# Patient Record
Sex: FEMALE | Race: BLACK OR AFRICAN AMERICAN | Employment: UNEMPLOYED | URBAN - METROPOLITAN AREA
[De-identification: names, ages, dates, MRNs, and addresses within clinical notes are randomized per-mention and may not be internally consistent; named-entity substitution may affect disease eponyms.]

---
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
## 2023-09-29 ENCOUNTER — HOSPITAL ENCOUNTER (OUTPATIENT)
Dept: DATA CONVERSION | Facility: HOSPITAL | Age: 18
Setting detail: OBSERVATION
Discharge: HOME | End: 2023-09-29
Attending: EMERGENCY MEDICINE | Admitting: EMERGENCY MEDICINE
Payer: COMMERCIAL

## 2023-09-29 DIAGNOSIS — T07.XXXA UNSPECIFIED MULTIPLE INJURIES, INITIAL ENCOUNTER: ICD-10-CM

## 2023-09-29 DIAGNOSIS — G89.11 ACUTE PAIN DUE TO TRAUMA: ICD-10-CM

## 2023-09-29 DIAGNOSIS — S85.109A: ICD-10-CM

## 2023-09-29 LAB
ABO GROUP (TYPE) IN BLOOD: NORMAL
ABO GROUP (TYPE) IN BLOOD: NORMAL
ANION GAP IN SER/PLAS: 22 MMOL/L (ref 10–20)
ANTIBODY SCREEN: NORMAL
CALCIUM (MG/DL) IN SER/PLAS: 8.5 MG/DL (ref 8.6–10.6)
CARBON DIOXIDE, TOTAL (MMOL/L) IN SER/PLAS: 15 MMOL/L (ref 21–32)
CHLORIDE (MMOL/L) IN SER/PLAS: 105 MMOL/L (ref 98–107)
CREATININE (MG/DL) IN SER/PLAS: 1.16 MG/DL (ref 0.5–1.05)
ERYTHROCYTE DISTRIBUTION WIDTH (RATIO) BY AUTOMATED COUNT: 12.6 % (ref 11.5–14.5)
ERYTHROCYTE MEAN CORPUSCULAR HEMOGLOBIN CONCENTRATION (G/DL) BY AUTOMATED: 36.6 G/DL (ref 32–36)
ERYTHROCYTE MEAN CORPUSCULAR VOLUME (FL) BY AUTOMATED COUNT: 79 FL (ref 80–100)
ERYTHROCYTES (10*6/UL) IN BLOOD BY AUTOMATED COUNT: 3.23 X10E12/L (ref 4–5.2)
ETHANOL (MG/DL) IN SER/PLAS: 223 MG/DL
FIBRINOGEN (MG/DL) IN PPP BY COAGULATION ASSAY: 263 MG/DL (ref 200–400)
GFR FEMALE: 69 ML/MIN/1.73M2
GLUCOSE (MG/DL) IN SER/PLAS: 96 MG/DL (ref 74–99)
HEMATOCRIT (%) IN BLOOD BY AUTOMATED COUNT: 25.4 % (ref 36–46)
HEMOGLOBIN (G/DL) IN BLOOD: 9.3 G/DL (ref 12–16)
INR IN PPP BY COAGULATION ASSAY: 1.1 (ref 0.9–1.1)
LEUKOCYTES (10*3/UL) IN BLOOD BY AUTOMATED COUNT: 18.6 X10E9/L (ref 4.4–11.3)
MAGNESIUM (MG/DL) IN SER/PLAS: 2.17 MG/DL (ref 1.6–2.4)
NRBC (PER 100 WBCS) BY AUTOMATED COUNT: 0 /100 WBC (ref 0–0)
PATIENT TEMPERATURE: 37 DEGREES C
PATIENT TEMPERATURE: 37 DEGREES C
PHOSPHATE (MG/DL) IN SER/PLAS: 3.5 MG/DL (ref 2.5–4.9)
PLATELETS (10*3/UL) IN BLOOD AUTOMATED COUNT: 339 X10E9/L (ref 150–450)
POCT ANION GAP, VENOUS: 17 MMOL/L (ref 10–25)
POCT ANION GAP, VENOUS: 19 MMOL/L (ref 10–25)
POCT BASE EXCESS, VENOUS: -10.7 MMOL/L (ref -2–3)
POCT BASE EXCESS, VENOUS: -9.3 MMOL/L (ref -2–3)
POCT CHLORIDE, VENOUS: 107 MMOL/L (ref 98–107)
POCT CHLORIDE, VENOUS: 107 MMOL/L (ref 98–107)
POCT GLUCOSE, VENOUS: 102 MG/DL (ref 74–99)
POCT GLUCOSE, VENOUS: 80 MG/DL (ref 74–99)
POCT GLUCOSE: 106 MG/DL (ref 74–99)
POCT GLUCOSE: 66 MG/DL (ref 74–99)
POCT HCO3, VENOUS: 16.2 MMOL/L (ref 22–26)
POCT HCO3, VENOUS: 16.4 MMOL/L (ref 22–26)
POCT HEMATOCRIT, VENOUS: 29 % (ref 36–46)
POCT HEMATOCRIT, VENOUS: 35 % (ref 36–46)
POCT HEMOGLOBIN, VENOUS: 11.8 G/DL (ref 12–16)
POCT HEMOGLOBIN, VENOUS: 9.5 G/DL (ref 12–16)
POCT IONIZED CALCIUM, VENOUS: 1.09 MMOL/L (ref 1.1–1.33)
POCT IONIZED CALCIUM, VENOUS: 1.1 MMOL/L (ref 1.1–1.33)
POCT LACTATE, VENOUS: 4.8 MMOL/L (ref 0.4–2)
POCT LACTATE, VENOUS: 9 MMOL/L (ref 0.4–2)
POCT OXY HEMOGLOBIN, VENOUS: 18.1 % (ref 45–75)
POCT OXY HEMOGLOBIN, VENOUS: 86.9 % (ref 45–75)
POCT PCO2, VENOUS: 33 MMHG (ref 41–51)
POCT PCO2, VENOUS: 40 MMHG (ref 41–51)
POCT PH, VENOUS: 7.22 (ref 7.33–7.43)
POCT PH, VENOUS: 7.3 (ref 7.33–7.43)
POCT PO2, VENOUS: 21 MMHG (ref 35–45)
POCT PO2, VENOUS: 64 MMHG (ref 35–45)
POCT POTASSIUM, VENOUS: 2.7 MMOL/L (ref 3.5–5.3)
POCT POTASSIUM, VENOUS: 2.9 MMOL/L (ref 3.5–5.3)
POCT SO2, VENOUS: 18 % (ref 45–75)
POCT SO2, VENOUS: 89 % (ref 45–75)
POCT SODIUM, VENOUS: 137 MMOL/L (ref 136–145)
POCT SODIUM, VENOUS: 139 MMOL/L (ref 136–145)
POTASSIUM (MMOL/L) IN SER/PLAS: 2.9 MMOL/L (ref 3.5–5.3)
PROTHROMBIN TIME (PT) IN PPP BY COAGULATION ASSAY: 12.1 SEC (ref 9.8–12.8)
RH FACTOR: NORMAL
RH FACTOR: NORMAL
SODIUM (MMOL/L) IN SER/PLAS: 139 MMOL/L (ref 136–145)
UREA NITROGEN (MG/DL) IN SER/PLAS: 11 MG/DL (ref 6–23)

## 2023-09-30 LAB
BLOOD EXPIRATION DATE: NORMAL
BLOOD EXPIRATION DATE: NORMAL
DISPENSE STATUS: NORMAL
DISPENSE STATUS: NORMAL
PRODUCT BLOOD TYPE: 9500
PRODUCT BLOOD TYPE: 9500
PRODUCT CODE: NORMAL
PRODUCT CODE: NORMAL
UNIT ABO: NORMAL
UNIT ABO: NORMAL
UNIT NUMBER: NORMAL
UNIT NUMBER: NORMAL
UNIT RH: NORMAL
UNIT RH: NORMAL
UNIT VOLUME: 278
UNIT VOLUME: 350
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
## 2023-10-03 ENCOUNTER — TELEPHONE (OUTPATIENT)
Dept: HOME HEALTH SERVICES | Age: 18
End: 2023-10-03
Payer: COMMERCIAL

## 2023-10-05 LAB
ERYTHROCYTE DISTRIBUTION WIDTH (RATIO) BY AUTOMATED COUNT: NORMAL
ERYTHROCYTE MEAN CORPUSCULAR HEMOGLOBIN CONCENTRATION (G/DL) BY AUTOMATED: NORMAL
ERYTHROCYTE MEAN CORPUSCULAR VOLUME (FL) BY AUTOMATED COUNT: NORMAL
ERYTHROCYTES (10*6/UL) IN BLOOD BY AUTOMATED COUNT: NORMAL
HEMATOCRIT (%) IN BLOOD BY AUTOMATED COUNT: NORMAL
HEMOGLOBIN (G/DL) IN BLOOD: NORMAL
LEUKOCYTES (10*3/UL) IN BLOOD BY AUTOMATED COUNT: NORMAL
NRBC (PER 100 WBCS) BY AUTOMATED COUNT: NORMAL
PLATELETS (10*3/UL) IN BLOOD AUTOMATED COUNT: NORMAL

## 2023-10-19 RX ORDER — CEPHALEXIN 500 MG/1
CAPSULE ORAL
COMMUNITY
Start: 2023-10-02

## 2023-10-19 RX ORDER — ACETAMINOPHEN 325 MG/1
TABLET ORAL
COMMUNITY
Start: 2023-10-02

## 2023-10-19 RX ORDER — OXYCODONE HYDROCHLORIDE 5 MG/1
TABLET ORAL
COMMUNITY
Start: 2023-10-02

## 2023-10-19 RX ORDER — VALACYCLOVIR HYDROCHLORIDE 500 MG/1
TABLET, FILM COATED ORAL
COMMUNITY
Start: 2023-03-10

## 2023-10-19 RX ORDER — DOXYCYCLINE 100 MG/1
100 CAPSULE ORAL 2 TIMES DAILY
COMMUNITY
Start: 2023-03-16

## 2023-10-19 RX ORDER — PHENAZOPYRIDINE HYDROCHLORIDE 100 MG/1
100 TABLET, FILM COATED ORAL
COMMUNITY
Start: 2022-11-17

## 2023-10-20 ENCOUNTER — APPOINTMENT (OUTPATIENT)
Dept: ORTHOPEDIC SURGERY | Facility: CLINIC | Age: 18
End: 2023-10-20
Payer: COMMERCIAL

## 2023-10-25 ENCOUNTER — HOSPITAL ENCOUNTER (EMERGENCY)
Age: 18
Discharge: HOME | End: 2023-10-25
Attending: EMERGENCY MEDICINE
Payer: COMMERCIAL

## 2023-10-25 VITALS
DIASTOLIC BLOOD PRESSURE: 55 MMHG | SYSTOLIC BLOOD PRESSURE: 131 MMHG | HEART RATE: 80 BPM | RESPIRATION RATE: 16 BRPM | TEMPERATURE: 98.3 F | OXYGEN SATURATION: 100 %

## 2023-10-25 DIAGNOSIS — T14.8XXA WOUND INFECTION: Primary | ICD-10-CM

## 2023-10-25 DIAGNOSIS — L08.9 WOUND INFECTION: Primary | ICD-10-CM

## 2023-10-25 LAB
ANION GAP SERPL CALCULATED.3IONS-SCNC: 9 MEQ/L (ref 9–15)
BASOPHILS # BLD: 0 K/UL (ref 0–0.2)
BASOPHILS NFR BLD: 0.2 %
BUN SERPL-MCNC: 8 MG/DL (ref 6–20)
CALCIUM SERPL-MCNC: 9.1 MG/DL (ref 8.5–9.9)
CHLORIDE SERPL-SCNC: 109 MEQ/L (ref 95–107)
CO2 SERPL-SCNC: 20 MEQ/L (ref 20–31)
CREAT SERPL-MCNC: 0.54 MG/DL (ref 0.5–0.9)
EOSINOPHIL # BLD: 0.1 K/UL (ref 0–0.7)
EOSINOPHIL NFR BLD: 1.7 %
ERYTHROCYTE [DISTWIDTH] IN BLOOD BY AUTOMATED COUNT: 15 % (ref 11.5–14.5)
GLUCOSE SERPL-MCNC: 84 MG/DL (ref 70–99)
HCT VFR BLD AUTO: 31 % (ref 37–47)
HGB BLD-MCNC: 10.1 G/DL (ref 12–16)
LYMPHOCYTES # BLD: 1.4 K/UL (ref 1–4.8)
LYMPHOCYTES NFR BLD: 17.2 %
MCH RBC QN AUTO: 26 PG (ref 27–31.3)
MCHC RBC AUTO-ENTMCNC: 32.6 % (ref 33–37)
MCV RBC AUTO: 79.9 FL (ref 79.4–94.8)
MONOCYTES # BLD: 0.7 K/UL (ref 0.2–0.8)
MONOCYTES NFR BLD: 8.6 %
NEUTROPHILS # BLD: 6 K/UL (ref 1.4–6.5)
NEUTS SEG NFR BLD: 71.8 %
PLATELET # BLD AUTO: 223 K/UL (ref 130–400)
POTASSIUM SERPL-SCNC: 4.1 MEQ/L (ref 3.4–4.9)
RBC # BLD AUTO: 3.88 M/UL (ref 4.2–5.4)
SODIUM SERPL-SCNC: 138 MEQ/L (ref 135–144)
WBC # BLD AUTO: 8.3 K/UL (ref 4.5–11)

## 2023-10-25 PROCEDURE — 85025 COMPLETE CBC W/AUTO DIFF WBC: CPT

## 2023-10-25 PROCEDURE — 36415 COLL VENOUS BLD VENIPUNCTURE: CPT

## 2023-10-25 PROCEDURE — 6370000000 HC RX 637 (ALT 250 FOR IP): Performed by: EMERGENCY MEDICINE

## 2023-10-25 PROCEDURE — 80048 BASIC METABOLIC PNL TOTAL CA: CPT

## 2023-10-25 PROCEDURE — 99283 EMERGENCY DEPT VISIT LOW MDM: CPT

## 2023-10-25 RX ORDER — ACETAMINOPHEN 325 MG/1
650 TABLET ORAL ONCE
Status: COMPLETED | OUTPATIENT
Start: 2023-10-25 | End: 2023-10-25

## 2023-10-25 RX ORDER — CEPHALEXIN 500 MG/1
500 CAPSULE ORAL 2 TIMES DAILY
Qty: 14 CAPSULE | Refills: 0 | Status: SHIPPED | OUTPATIENT
Start: 2023-10-25 | End: 2023-11-01

## 2023-10-25 RX ADMIN — ACETAMINOPHEN 650 MG: 325 TABLET ORAL at 13:31

## 2023-10-25 NOTE — DISCHARGE INSTRUCTIONS
RETURN FOR NEW OR WORSENING SYMPTOMS. KEEP WOUND CLEAN AND DRY. FOLLOW UP WITH TRAUMA AT . NON WEIGHT BEARING UNTIL CLEARED BY ORTHO.

## 2023-10-25 NOTE — ED NOTES
Call to Trauma Dr Sarah Huffman @ [facility]   @ [phone] to NP        Deja Sorenson  10/25/23 [phone]

## 2023-10-25 NOTE — ED PROVIDER NOTES
1:38 PM EDT  [facility] ED  EMERGENCY DEPARTMENT ENCOUNTER      Pt Name: Danii Valentine  MRN: [id number]  [address] [place] 2005  Date of evaluation: 10/25/2023  Provider: Reba Albarran MD    CHIEF COMPLAINT       Chief Complaint   Patient presents with    Wound Infection         HISTORY OF PRESENT ILLNESS   (Location/Symptom, Timing/Onset, Context/Setting, Quality, Duration, Modifying Factors, Severity)  Note limiting factors. 64-year-old female presenting with left leg wound. Patient had a procedure done at [facility] by trauma surgery several months ago. She does not know what exactly. She has not followed up. Supposed to follow-up yesterday to have splint removed and did not go to the appointment. She is a poor historian. Comes in for evaluation of wound. Nursing Notes were reviewed. REVIEW OF SYSTEMS    (2-9 systems for level 4, 10 or more for level 5)     Review of Systems   Skin:  Positive for wound. All other systems reviewed and are negative. Except as noted above the remainder of the review of systems was reviewed and negative.        PAST MEDICAL HISTORY     Past Medical History:   Diagnosis Date    Genital herpes     Heart murmur     Sickle-cell trait ([address])          SURGICAL HISTORY       Past Surgical History:   Procedure Laterality Date     SECTION N/A 10/11/2021     SECTION performed by Destinee Calhoun DO at [facility] L&D OR         CURRENT MEDICATIONS       Previous Medications    IBUPROFEN (ADVIL;MOTRIN) 600 MG TABLET    Take 1 tablet by mouth every 6 hours as needed for Pain    PENICILLIN V POTASSIUM (VEETID) 500 MG TABLET    Take 1 tablet by mouth 4 times daily    PRENATAL VIT-FE FUMARATE-FA (PREPLUS) 27-1 MG TABS    Take 1 tablet by mouth daily    VALACYCLOVIR (VALTREX) 500 MG TABLET    Take 1 tablet by mouth 2 times daily    VALACYCLOVIR (VALTREX) 500 MG TABLET    Take 1 tablet by mouth 2 times daily       ALLERGIES     Patient
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
## (undated) DEVICE — STANDARD SURGICAL GOWN, L: Brand: CONVERTORS

## (undated) DEVICE — STERILE NEOPRENE POWDER-FREE SURGICAL GLOVES WITH NITRILE COATING: Brand: PROTEXIS

## (undated) DEVICE — SUTURE VCRL SZ 1 L36IN ABSRB UD L36MM CT-1 1/2 CIR J947H

## (undated) DEVICE — ISLAND DRESSING 2IN X 3IN: Brand: SILVERLON ANTIMICROBIAL WOUND DRESSING

## (undated) DEVICE — DRESSING TELFA STRL 3X6